# Patient Record
Sex: MALE | Race: WHITE | URBAN - METROPOLITAN AREA
[De-identification: names, ages, dates, MRNs, and addresses within clinical notes are randomized per-mention and may not be internally consistent; named-entity substitution may affect disease eponyms.]

---

## 2017-10-02 ENCOUNTER — OFFICE VISIT (OUTPATIENT)
Dept: UROLOGY | Facility: CLINIC | Age: 82
End: 2017-10-02
Payer: COMMERCIAL

## 2017-10-02 DIAGNOSIS — R31.0 GROSS HEMATURIA: Primary | ICD-10-CM

## 2017-10-02 DIAGNOSIS — N40.1 BENIGN PROSTATIC HYPERPLASIA WITH LOWER URINARY TRACT SYMPTOMS, SYMPTOM DETAILS UNSPECIFIED: ICD-10-CM

## 2017-10-02 PROCEDURE — 99214 OFFICE O/P EST MOD 30 MIN: CPT | Performed by: UROLOGY

## 2017-10-02 RX ORDER — FINASTERIDE 5 MG/1
5 TABLET, FILM COATED ORAL DAILY
Qty: 90 TABLET | Refills: 3 | Status: SHIPPED | OUTPATIENT
Start: 2017-10-02

## 2017-10-02 NOTE — PATIENT INSTRUCTIONS
Please call [facility] to schedule your CT scan at [facility]. [phone].  They are located near the intersection of [facility] and [address] [place] [address].      Your cystoscopy with Dr. Dutta has been scheduled for 11/14/2017 at 10:00.    If you have any questions or need to reschedule please call

## 2017-10-02 NOTE — PROGRESS NOTES
Chief Complaint   Patient presents with     RECHECK     gross hematuria       Pierre Hart is a 86 year old male who presents today for follow up of   Chief Complaint   Patient presents with     RECHECK     gross hematuria    patient f/u for gross hematuria.  He has had gross hematuria for several weeks per nursing home's report.  He has no urinary complaints.  He is s/p TURP several years ago for urinary retention.  He has dementia/parkinson disease.  He has no pain/dysuria.    Current Outpatient Prescriptions   Medication Sig Dispense Refill     finasteride (PROSCAR) 5 MG tablet Take 1 tablet (5 mg) by mouth daily 90 tablet 3     carbidopa-levodopa (SINEMET)  MG per tablet Two tablets at 8am, and 6pm. 2.5 tablets at 1pm. 595 tablet 3     cholecalciferol (VITAMIN  -D) 1000 UNITS capsule Take 1 capsule by mouth daily 2000 mg daily       aspirin 81 MG EC tablet Take 81 mg by mouth daily. 1 daily         ascorbic acid (VITAMIN C) 500 MG tablet Take 500 mg by mouth daily. 1 daily       No Known Allergies   Past Medical History:   Diagnosis Date     Acute nonsuppurative otitis media, unspecified      Allergic rhinitis      BPH      ED (erectile dysfunction)      GERD (gastroesophageal reflux disease)      HCD (health care directive)      High cholesterol      Hypertension      Nonsenile cataract      Other and unspecified chronic nonsuppurative otitis media      S/P transurethral resection of prostate 11/05/2003    transurethral resection of the prostate and 3-way naidu catheter placement     Past Surgical History:   Procedure Laterality Date     CATARACT IOL, RT/LT       ENDOSCOPY  02/07/2002    Upper endoscopy     ENT SURGERY  2009    Right ear Mastoidectomy     GENITOURINARY SURGERY  2010    Bladder     PHACOEMULSIFICATION WITH STANDARD INTRAOCULAR LENS IMPLANT  9/2014; 12/2014    right eye; left eye     Family History   Problem Relation Age of Onset     DIABETES No family hx of      Social History      Social History     Marital status:      Spouse name: Christy     Number of children: 3     Years of education: 14     Occupational History      Retired     1990     Social History Main Topics     Smoking status: Former Smoker     Types: Cigarettes     Quit date: 1/1/1980     Smokeless tobacco: Never Used     Alcohol use 0.0 oz/week     0 Standard drinks or equivalent per week      Comment:   Drinks once torri 2 weeks. 2 beers and a couple shots weekly     Drug use: No     Sexual activity: No     Other Topics Concern     None     Social History Narrative       REVIEW OF SYSTEMS  =================  C: NEGATIVE for fever, chills, change in weight  I: NEGATIVE for worrisome rashes, moles or lesions  E/M: NEGATIVE for ear, mouth and throat problems  R: NEGATIVE for significant cough or SHORTNESS OF BREATH,   CV: NEGATIVE for chest pain, palpitations or peripheral edema  GI: NEGATIVE for nausea, abdominal pain, heartburn, or change in bowel habits  NEURO: NEGATIVE any motor/sensory changes  PSYCH: NEGATIVE for recent mood disorder    Physical Exam:  There were no vitals taken for this visit.   Patient is pleasant, in no acute distress, good general condition.  Lung: no evidence of respiratory distress    Abdomen: Soft, nondistended, non tender. No masses. No rebound or guarding.   Exam: no cva tenderness  Skin: Warm and dry.  No redness.  Psych: normal mood and affect  Neuro: alert and oriented    Assessment/Plan:   (R31.0) Gross hematuria  (primary encounter diagnosis)  Comment:  Most likely prostate related  Plan: CT Abdomen Pelvis Hematuria w/wo IV Contrast,         finasteride (PROSCAR) 5 MG tablet             (N40.1) Benign prostatic hyperplasia with lower urinary tract symptoms, symptom details unspecified  Comment:    Plan: finasteride (PROSCAR) 5 MG tablet

## 2017-10-02 NOTE — MR AVS SNAPSHOT
After Visit Summary   10/2/2017    Pierre Hart    MRN: [id number]           Patient Information     Date Of Birth          10/26/1930        Visit Information        Provider Department      10/2/2017 10:45 AM Anderson Dutta MD [facility]        Today's Diagnoses     Gross hematuria    -  1    Benign prostatic hyperplasia with lower urinary tract symptoms, symptom details unspecified          Care Instructions    Please call [facility] to schedule your CT scan at [facility]. [phone].  They are located near the intersection of [facility] and [address] [place] [address].      Your cystoscopy with Dr. Dutta has been scheduled for 11/14/2017 at 10:00.    If you have any questions or need to reschedule please call             Follow-ups after your visit        Your next 10 appointments already scheduled     Oct 24, 2017 10:00 AM CDT   Return Visit with Ken Hidalgo MD   [facility] ([facility]    [id number] [place] [id number] [phone]            Nov 14, 2017 10:00 AM CST   Return Visit with Anderson Dutta MD, MARZENA CYSTO PROC ROOM   [facility] ([facility])    [address] [place] [id number] [phone]              Future tests that were ordered for you today     Open Future Orders        Priority Expected Expires Ordered    CT Abdomen Pelvis Hematuria w/wo IV Contrast Routine 10/2/2017 10/2/2018 10/2/2017            Who to contact     If you have questions or need follow up information about today's clinic visit or your schedule please contact [facility] directly at [phone].  Normal or non-critical lab and imaging results will be communicated to you by MyChart, letter or phone within 4 business days after the clinic has received the results. If you do not hear from us within 7 days, please contact the clinic through MyChart or phone. If you have a  "critical or abnormal lab result, we will notify you by phone as soon as possible.  Submit refill requests through [employer] or call your pharmacy and they will forward the refill request to us. Please allow 3 business days for your refill to be completed.          Additional Information About Your Visit        [employer] Information     [employer] lets you send messages to your doctor, view your test results, renew your prescriptions, schedule appointments and more. To sign up, go to [email or web address] . Click on \"Log in\" on the left side of the screen, which will take you to the Welcome page. Then click on \"Sign up Now\" on the right side of the page.     You will be asked to enter the access code listed below, as well as some personal information. Please follow the directions to create your username and password.     Your access code is: [email or web address]  Expires: 2017 10:54 AM     Your access code will  in 90 days. If you need help or a new code, please call your [facility] clinic or [phone].        Care EveryWhere ID     This is your Care EveryWhere ID. This could be used by other organizations to access your [facility] medical records  [id number]         Blood Pressure from Last 3 Encounters:   16 (!) 80/52   16 132/72   16 104/56    Weight from Last 3 Encounters:   16 89.8 kg (198 lb)   16 98 kg (216 lb)   16 97.8 kg (215 lb 9.6 oz)                 Today's Medication Changes          These changes are accurate as of: 10/2/17 10:54 AM.  If you have any questions, ask your nurse or doctor.               Start taking these medicines.        Dose/Directions    finasteride 5 MG tablet   Commonly known as:  PROSCAR   Used for:  Gross hematuria, Benign prostatic hyperplasia with lower urinary tract symptoms, symptom details unspecified   Started by:  Anderson Dutta MD        Dose:  5 mg   Take 1 tablet (5 mg) by mouth daily   Quantity:  90 tablet   Refills:  3          "   Where to get your medicines      Some of these will need a paper prescription and others can be bought over the counter.  Ask your nurse if you have questions.     Bring a paper prescription for each of these medications     finasteride 5 MG tablet                Primary Care Provider Office Phone # Fax #    Rodriguez Rizvi [phone] [address] [address] [facility] [address]        Equal Access to Services     [facility] LUDA : Hadii aad ku hadasho Soomaali, waaxda luqadaha, qaybta kaalmada adeegyada, waxay idiin hayaan adeeg kharash la'ankitn ah. So [facility] [phone].    ATENCIÓN: Si habla español, tiene a vega disposición servicios gratuitos de asistencia lingüística. Llame al [phone].    We comply with applicable federal civil rights laws and [place] laws. We do not discriminate on the basis of race, color, national origin, age, disability, sex, sexual orientation, or gender identity.            Thank you!     Thank you for choosing [facility]  for your care. Our goal is always to provide you with excellent care. Hearing back from our patients is one way we can continue to improve our services. Please take a few minutes to complete the written survey that you may receive in the mail after your visit with us. Thank you!             Your Updated Medication List - Protect others around you: Learn how to safely use, store and throw away your medicines at www.disposemymeds.org.          This list is accurate as of: 10/2/17 10:54 AM.  Always use your most recent med list.                   Brand Name Dispense Instructions for use Diagnosis    ascorbic acid 500 MG tablet    VITAMIN C     Take 500 mg by mouth daily. 1 daily        aspirin 81 MG EC tablet      Take 81 mg by mouth daily. 1 daily        carbidopa-levodopa  MG per tablet    SINEMET    595 tablet    Two tablets at 8am, and 6pm. 2.5 tablets at 1pm.    Parkinson disease (H)       cholecalciferol 1000 UNITS capsule     vitamin  -D     Take 1 capsule by mouth daily 2000 mg daily        finasteride 5 MG tablet    PROSCAR    90 tablet    Take 1 tablet (5 mg) by mouth daily    Gross hematuria, Benign prostatic hyperplasia with lower urinary tract symptoms, symptom details unspecified

## 2017-10-24 ENCOUNTER — OFFICE VISIT (OUTPATIENT)
Dept: NEUROLOGY | Facility: CLINIC | Age: 82
End: 2017-10-24
Payer: COMMERCIAL

## 2017-10-24 VITALS — HEIGHT: 74 IN | DIASTOLIC BLOOD PRESSURE: 64 MMHG | SYSTOLIC BLOOD PRESSURE: 99 MMHG | HEART RATE: 86 BPM

## 2017-10-24 DIAGNOSIS — G20.A1 PARKINSON DISEASE (H): Primary | ICD-10-CM

## 2017-10-24 DIAGNOSIS — F02.80 LATE ONSET ALZHEIMER'S DISEASE WITHOUT BEHAVIORAL DISTURBANCE (H): ICD-10-CM

## 2017-10-24 DIAGNOSIS — G30.1 LATE ONSET ALZHEIMER'S DISEASE WITHOUT BEHAVIORAL DISTURBANCE (H): ICD-10-CM

## 2017-10-24 PROCEDURE — 99214 OFFICE O/P EST MOD 30 MIN: CPT | Performed by: PSYCHIATRY & NEUROLOGY

## 2017-10-24 RX ORDER — CHLORHEXIDINE GLUCONATE ORAL RINSE 1.2 MG/ML
15 SOLUTION DENTAL 2 TIMES DAILY
Refills: 0 | COMMUNITY
Start: 2017-10-11

## 2017-10-24 RX ORDER — IBUPROFEN 800 MG/1
TABLET, FILM COATED ORAL DAILY PRN
Refills: 0 | COMMUNITY
Start: 2017-10-11

## 2017-10-24 NOTE — LETTER
10/24/2017         RE: Pierre Hart  [address] [address]        Dear Colleague,    Thank you for referring your patient, Pierre Hart, to the [facility]. Please see a copy of my visit note below.                               ESTABLISHED PATIENT NEUROLOGY NOTE    LOCATION: [facility]  DATE OF VISIT: 10/24/2017    NAME: Mr.Allen Chris Hart  : 10/26/1930 (86 year old)  MR #: [id number]    PRIMARY/REFERRING PROVIDER: Rodriguez Rizvi MD    REASON FOR VISIT: Parkinson disease    HISTORY OF PRESENT ILLNESS: Mr. Hart is accompanied by wife. 87-year-old man with Parkinson disease and Alzheimer disease. Most of the details of history obtained by his wife. He is in a nursing home at [facility] His wife lives in [place]. He has hearing aid for both ears. Wheelchair bound.     He had a fall in 2017. His wife that he thinks that he can walk and he tends to fall. He has been getting physical therapy and getting leg stretching exercises.    Parkinson's disease: He has been taking Sinemet 25/100 strength tablets, 2 at 8 AM and 6 PM, 2.5 tablets at 1 PM. His wife does not think that there are problems with Parkinson disease. No difficulty with the swallowing food. No increased sweating. Tendency to have increased drooling of saliva.    Alzheimer's disease: He was taking Rivastigmine. His primary provider at the nursing home felt that there is no continuing this medication because of the progression of the disease to this degree. It was not found to be helpful.His wife understands it. He has been getting speech therapy along with physical therapy. There are times that he has difficulty finding the right word.  No behavioral problems reported by his wife.  He has been using compression stockings for leg swelling. Tends to keep the feet elevated while sitting in a chair.    FAMILY HISTORY: No family history of Parkinson disease or tremors    No Known  Allergies  Current Outpatient Prescriptions   Medication Sig     ibuprofen (ADVIL/MOTRIN) 800 MG tablet daily as needed      omeprazole (PRILOSEC) 20 MG CR capsule daily      acetaminophen-codeine (TYLENOL #3) 300-30 MG per tablet every 6 hours as needed      finasteride (PROSCAR) 5 MG tablet Take 1 tablet (5 mg) by mouth daily     carbidopa-levodopa (SINEMET)  MG per tablet Two tablets at 8am, and 6pm. 2.5 tablets at 1pm.     cholecalciferol (VITAMIN  -D) 1000 UNITS capsule Take 1 capsule by mouth daily 2000 mg daily     aspirin 81 MG EC tablet Take 81 mg by mouth daily. 1 daily       ascorbic acid (VITAMIN C) 500 MG tablet Take 500 mg by mouth daily. 1 daily     chlorhexidine (PERIDEX) 0.12 % solution Take 15 mLs by mouth 2 times daily GIVE 15cc by mouth two times for wound care (tooth extraction) Rinse for 1 minute then spit     No current facility-administered medications for this visit.        Current Outpatient Prescriptions on File Prior to Visit:  finasteride (PROSCAR) 5 MG tablet Take 1 tablet (5 mg) by mouth daily   carbidopa-levodopa (SINEMET)  MG per tablet Two tablets at 8am, and 6pm. 2.5 tablets at 1pm.   cholecalciferol (VITAMIN  -D) 1000 UNITS capsule Take 1 capsule by mouth daily 2000 mg daily   aspirin 81 MG EC tablet Take 81 mg by mouth daily. 1 daily   ascorbic acid (VITAMIN C) 500 MG tablet Take 500 mg by mouth daily. 1 daily     No current facility-administered medications on file prior to visit.   Past Medical History:   Diagnosis Date     Acute nonsuppurative otitis media, unspecified      Allergic rhinitis      BPH      ED (erectile dysfunction)      GERD (gastroesophageal reflux disease)      HCD (health care directive)      High cholesterol      Hypertension      Nonsenile cataract      Other and unspecified chronic nonsuppurative otitis media      S/P transurethral resection of prostate 11/05/2003    transurethral resection of the prostate and 3-way naidu catheter placement  "    Past Surgical History:   Procedure Laterality Date     CATARACT IOL, RT/LT       ENDOSCOPY  02/07/2002    Upper endoscopy     ENT SURGERY  2009    Right ear Mastoidectomy     GENITOURINARY SURGERY  2010    Bladder     PHACOEMULSIFICATION WITH STANDARD INTRAOCULAR LENS IMPLANT  9/2014; 12/2014    right eye; left eye     PERSONAL & SOCIAL HISTORY Reviewed and documented in [facility]    GENERAL EXAMINATION: BP 99/64 (BP Location: Left arm, Patient Position: Chair, Cuff Size: Adult Regular)  Pulse 86  Ht 1.88 m (6' 2\")    IMPRESSION:   Encounter Diagnoses   Name Primary?     Parkinson disease (H) Yes     Late onset Alzheimer's disease without behavioral disturbance      COMMENTS: Fall has been because of progression of both Parkinson's Disease and AD. Reviewed safety issues.     PLANS:   Patient Instructions   AFTER VISIT SUMMARY (AVS)    Continue Sinemet 25/100 as before    Agree with Pharmacist's recommendation to discontinue RIVASTIGMINE    Continue physical therapy and speech therapy in progress    Fall prevention information      Preventive Neurology: Encouraged to keep physically and mentally active with particular emphasis on daily stretching exercises, and healthy eating.    To call us for follow-up appointment in next 6 month(s) or earlier if needed.    Thanks to Rodriguez Rizvi MD for allowing me to participate in Mr. Hart's care. Please feel free to call me with any questions or concerns.     Total Time: Time with patient 25 minutes, greater than 50% of which was counseling and coordination of care.      Ken Hidalgo MD, [facility]  Neurologist    Cc: Rodriguez Rizvi MD          Again, thank you for allowing me to participate in the care of your patient.        Sincerely,        Ken Hidalgo MD    "

## 2017-10-24 NOTE — NURSING NOTE
"Chief Complaint   Patient presents with     RECHECK     Parkinson Disease/ Falls       Initial BP 99/64 (BP Location: Left arm, Patient Position: Chair, Cuff Size: Adult Regular)  Pulse 86  Ht 1.88 m (6' 2\") Estimated body mass index is 25.42 kg/(m^2) as calculated from the following:    Height as of 9/27/16: 1.88 m (6' 2\").    Weight as of 12/5/16: 89.8 kg (198 lb).  Medication Reconciliation: complete   Bibiana Mcnamara MA      "

## 2017-10-24 NOTE — MR AVS SNAPSHOT
After Visit Summary   10/24/2017    Pierre Hart    MRN: [id number]           Patient Information     Date Of Birth          10/26/1930        Visit Information        Provider Department      10/24/2017 10:00 AM Ken Hidalgo MD [facility]        Today's Diagnoses     Parkinson disease (H)    -  1    Late onset Alzheimer's disease without behavioral disturbance          Care Instructions    AFTER VISIT SUMMARY (AVS)    Continue Sinemet 25/100 as before    Agree with Pharmacist's recommendation to discontinue RIVASTIGMINE    Continue physical therapy and speech therapy in progress    Fall prevention information      Preventive Neurology: Encouraged to keep physically and mentally active with particular emphasis on daily stretching exercises, and healthy eating.    To call us for follow-up appointment in next 6 month(s) or earlier if needed.                      Follow-ups after your visit        Follow-up notes from your care team     Return in about 6 months (around 4/24/2018) for Parkinson's disease follow-up.      Your next 10 appointments already scheduled     Oct 30, 2017 10:00 AM CDT   CT ABDOMEN PELVIS HEMATURIA W/O & W CONTRAST with BECT1   [facility] ([facility])    [address] [facility] [address] [phone]           Please bring any scans or X-rays taken at other hospitals, if similar tests were done. Also bring a list of your medicines, including vitamins, minerals and over-the-counter drugs. It is safest to leave personal items at home.  Be sure to tell your doctor:   If you have any allergies.   If there s any chance you are pregnant.   If you are breastfeeding.   If you have any special needs.  You will have contrast for this exam. To prepare:   Do not eat or drink for 2 hours before your exam. If you need to take medicine, you may take it with small sips of water. (We may ask you to take liquid medicine as well.)    The day before your exam, drink extra fluids at least six 8-ounce glasses (unless your doctor tells you to restrict your fluids).  Patients over 70 or patients with diabetes or kidney problems:   If you haven t had a blood test (creatinine test) within the last 30 days, go to your clinic or Diagnostic Imaging Department for this test.  If you have diabetes:   If your kidney function is normal, continue taking your metformin (Avandamet, Glucophage, Glucovance, Metaglip) on the day of your exam.   If your kidney function is abnormal, wait 48 hours before restarting this medicine.  Please wear loose clothing, such as a sweat suit or jogging clothes. Avoid snaps, zippers and other metal. We may ask you to undress and put on a hospital gown.  If you have any questions, please call the Imaging Department where you will have your exam.            Nov 14, 2017 10:00 AM CST   Return Visit with Anderson Dutta MD, COLLIN CYSTO PROC ROOM   [facility] ([address] [address] [phone]              Who to contact     If you have questions or need follow up information about today's clinic visit or your schedule please contact [facility] directly at [phone].  Normal or non-critical lab and imaging results will be communicated to you by MyChart, letter or phone within 4 business days after the clinic has received the results. If you do not hear from us within 7 days, please contact the clinic through MyChart or phone. If you have a critical or abnormal lab result, we will notify you by phone as soon as possible.  Submit refill requests through [employer] or call your pharmacy and they will forward the refill request to us. Please allow 3 business days for your refill to be completed.          Additional Information About Your Visit        [employer] Information     [employer] lets you send messages to your doctor, view your test results, renew your  "prescriptions, schedule appointments and more. To sign up, go to [email or web address] . Click on \"Log in\" on the left side of the screen, which will take you to the Welcome page. Then click on \"Sign up Now\" on the right side of the page.     You will be asked to enter the access code listed below, as well as some personal information. Please follow the directions to create your username and password.     Your access code is: [email or web address]  Expires: 2017 10:54 AM     Your access code will  in 90 days. If you need help or a new code, please call your [facility] clinic or [phone].        Care EveryWhere ID     This is your Care EveryWhere ID. This could be used by other organizations to access your [facility] medical records  [id number]        Your Vitals Were     Pulse Height                86 1.88 m (6' 2\")           Blood Pressure from Last 3 Encounters:   10/24/17 99/64   16 (!) 80/52   16 132/72    Weight from Last 3 Encounters:   16 89.8 kg (198 lb)   16 98 kg (216 lb)   16 97.8 kg (215 lb 9.6 oz)              Today, you had the following     No orders found for display       Primary Care Provider Office Phone # Fax #    Rodriguez Ilan Rizvi [phone] [address] [facility] [address]        Equal Access to Services     [facility]: Hadii aad ku hadasho Soomaali, waaxda luqadaha, qaybta kaalmada adeegyada, jhonny coleman . So [facility] [phone].    ATENCIÓN: Si habla español, tiene a vega disposición servicios gratuitos de asistencia lingüística. Llame al [phone].    We comply with applicable federal civil rights laws and [place] laws. We do not discriminate on the basis of race, color, national origin, age, disability, sex, sexual orientation, or gender identity.            Thank you!     Thank you for choosing [facility]  for your care. Our goal is always to provide you with excellent care. " Hearing back from our patients is one way we can continue to improve our services. Please take a few minutes to complete the written survey that you may receive in the mail after your visit with us. Thank you!             Your Updated Medication List - Protect others around you: Learn how to safely use, store and throw away your medicines at www.disposemymeds.org.          This list is accurate as of: 10/24/17 11:18 AM.  Always use your most recent med list.                   Brand Name Dispense Instructions for use Diagnosis    acetaminophen-codeine 300-30 MG per tablet    TYLENOL #3     every 6 hours as needed        ascorbic acid 500 MG tablet    VITAMIN C     Take 500 mg by mouth daily. 1 daily        aspirin 81 MG EC tablet      Take 81 mg by mouth daily. 1 daily        carbidopa-levodopa  MG per tablet    SINEMET    595 tablet    Two tablets at 8am, and 6pm. 2.5 tablets at 1pm.    Parkinson disease (H)       chlorhexidine 0.12 % solution    PERIDEX     Take 15 mLs by mouth 2 times daily GIVE 15cc by mouth two times for wound care (tooth extraction) Rinse for 1 minute then spit        cholecalciferol 1000 UNITS capsule    vitamin  -D     Take 1 capsule by mouth daily 2000 mg daily        finasteride 5 MG tablet    PROSCAR    90 tablet    Take 1 tablet (5 mg) by mouth daily    Gross hematuria, Benign prostatic hyperplasia with lower urinary tract symptoms, symptom details unspecified       ibuprofen 800 MG tablet    ADVIL/MOTRIN     daily as needed        omeprazole 20 MG CR capsule    priLOSEC     daily

## 2017-10-24 NOTE — PATIENT INSTRUCTIONS
AFTER VISIT SUMMARY (AVS)    Continue Sinemet 25/100 as before    Continue physical therapy and speech therapy in progress    Fall prevention information      Preventive Neurology: Encouraged to keep physically and mentally active with particular emphasis on daily stretching exercises, and healthy eating.    To call us for follow-up appointment in next 6 month(s) or earlier if needed.

## 2017-10-25 NOTE — PROGRESS NOTES
ESTABLISHED PATIENT NEUROLOGY NOTE    LOCATION: [facility]  DATE OF VISIT: 10/24/2017    NAME: Mr.Allen Chris Hart  : 10/26/1930 (86 year old)  MR #: [id number]    PRIMARY/REFERRING PROVIDER: Rodriguez Rizvi MD    REASON FOR VISIT: Parkinson disease    HISTORY OF PRESENT ILLNESS: Mr. Hart is accompanied by wife. 87-year-old man with Parkinson disease and Alzheimer disease. Most of the details of history obtained by his wife. He is in a nursing home at [facility] His wife lives in [place]. He has hearing aid for both ears. Wheelchair bound.     He had a fall in 2017. His wife that he thinks that he can walk and he tends to fall. He has been getting physical therapy and getting leg stretching exercises.    Parkinson's disease: He has been taking Sinemet 25/100 strength tablets, 2 at 8 AM and 6 PM, 2.5 tablets at 1 PM. His wife does not think that there are problems with Parkinson disease. No difficulty with the swallowing food. No increased sweating. Tendency to have increased drooling of saliva.    Alzheimer's disease: He was taking Rivastigmine. His primary provider at the nursing home felt that there is no continuing this medication because of the progression of the disease to this degree. It was not found to be helpful.His wife understands it. He has been getting speech therapy along with physical therapy. There are times that he has difficulty finding the right word.  No behavioral problems reported by his wife.  He has been using compression stockings for leg swelling. Tends to keep the feet elevated while sitting in a chair.    FAMILY HISTORY: No family history of Parkinson disease or tremors    No Known Allergies  Current Outpatient Prescriptions   Medication Sig     ibuprofen (ADVIL/MOTRIN) 800 MG tablet daily as needed      omeprazole (PRILOSEC) 20 MG CR capsule daily      acetaminophen-codeine (TYLENOL #3) 300-30 MG per tablet every 6 hours as needed       finasteride (PROSCAR) 5 MG tablet Take 1 tablet (5 mg) by mouth daily     carbidopa-levodopa (SINEMET)  MG per tablet Two tablets at 8am, and 6pm. 2.5 tablets at 1pm.     cholecalciferol (VITAMIN  -D) 1000 UNITS capsule Take 1 capsule by mouth daily 2000 mg daily     aspirin 81 MG EC tablet Take 81 mg by mouth daily. 1 daily       ascorbic acid (VITAMIN C) 500 MG tablet Take 500 mg by mouth daily. 1 daily     chlorhexidine (PERIDEX) 0.12 % solution Take 15 mLs by mouth 2 times daily GIVE 15cc by mouth two times for wound care (tooth extraction) Rinse for 1 minute then spit     No current facility-administered medications for this visit.        Current Outpatient Prescriptions on File Prior to Visit:  finasteride (PROSCAR) 5 MG tablet Take 1 tablet (5 mg) by mouth daily   carbidopa-levodopa (SINEMET)  MG per tablet Two tablets at 8am, and 6pm. 2.5 tablets at 1pm.   cholecalciferol (VITAMIN  -D) 1000 UNITS capsule Take 1 capsule by mouth daily 2000 mg daily   aspirin 81 MG EC tablet Take 81 mg by mouth daily. 1 daily   ascorbic acid (VITAMIN C) 500 MG tablet Take 500 mg by mouth daily. 1 daily     No current facility-administered medications on file prior to visit.   Past Medical History:   Diagnosis Date     Acute nonsuppurative otitis media, unspecified      Allergic rhinitis      BPH      ED (erectile dysfunction)      GERD (gastroesophageal reflux disease)      HCD (health care directive)      High cholesterol      Hypertension      Nonsenile cataract      Other and unspecified chronic nonsuppurative otitis media      S/P transurethral resection of prostate 11/05/2003    transurethral resection of the prostate and 3-way naidu catheter placement     Past Surgical History:   Procedure Laterality Date     CATARACT IOL, RT/LT       ENDOSCOPY  02/07/2002    Upper endoscopy     ENT SURGERY  2009    Right ear Mastoidectomy     GENITOURINARY SURGERY  2010    Bladder     PHACOEMULSIFICATION WITH STANDARD  "INTRAOCULAR LENS IMPLANT  9/2014; 12/2014    right eye; left eye     PERSONAL & SOCIAL HISTORY Reviewed and documented in [facility]    GENERAL EXAMINATION: BP 99/64 (BP Location: Left arm, Patient Position: Chair, Cuff Size: Adult Regular)  Pulse 86  Ht 1.88 m (6' 2\")    IMPRESSION:   Encounter Diagnoses   Name Primary?     Parkinson disease (H) Yes     Late onset Alzheimer's disease without behavioral disturbance      COMMENTS: Fall has been because of progression of both Parkinson's Disease and AD. Reviewed safety issues.     PLANS:   Patient Instructions   AFTER VISIT SUMMARY (AVS)    Continue Sinemet 25/100 as before    Agree with Pharmacist's recommendation to discontinue RIVASTIGMINE    Continue physical therapy and speech therapy in progress    Fall prevention information      Preventive Neurology: Encouraged to keep physically and mentally active with particular emphasis on daily stretching exercises, and healthy eating.    To call us for follow-up appointment in next 6 month(s) or earlier if needed.    Thanks to Rodriguez Rizvi MD for allowing me to participate in Mr. Hart's care. Please feel free to call me with any questions or concerns.     Total Time: Time with patient 25 minutes, greater than 50% of which was counseling and coordination of care.      Ken Hidalgo MD, [facility]  Neurologist    Cc: Rodriguez Rizvi MD        "

## 2017-10-30 ENCOUNTER — RADIANT APPOINTMENT (OUTPATIENT)
Dept: CT IMAGING | Facility: CLINIC | Age: 82
End: 2017-10-30
Attending: UROLOGY
Payer: COMMERCIAL

## 2017-10-30 DIAGNOSIS — R31.0 GROSS HEMATURIA: ICD-10-CM

## 2017-10-30 LAB
CREAT BLD-MCNC: 1.5 MG/DL (ref 0.5–1.2)
GFR SERPL CREATININE-BSD FRML MDRD: 44 ML/MIN/{1.73_M2}
GFRB: 41

## 2017-10-30 PROCEDURE — 82565 ASSAY OF CREATININE: CPT

## 2017-10-30 PROCEDURE — 74178 CT ABD&PLV WO CNTR FLWD CNTR: CPT | Mod: TC

## 2017-10-30 PROCEDURE — 36415 COLL VENOUS BLD VENIPUNCTURE: CPT

## 2017-10-30 RX ORDER — IOPAMIDOL 755 MG/ML
95 INJECTION, SOLUTION INTRAVASCULAR ONCE
Status: COMPLETED | OUTPATIENT
Start: 2017-10-30 | End: 2017-10-30

## 2017-10-30 RX ADMIN — IOPAMIDOL 95 ML: 755 INJECTION, SOLUTION INTRAVASCULAR at 10:24

## 2017-11-14 ENCOUNTER — TELEPHONE (OUTPATIENT)
Dept: UROLOGY | Facility: CLINIC | Age: 82
End: 2017-11-14

## 2017-11-14 NOTE — TELEPHONE ENCOUNTER
Reason for call:  Other [facility] Reschedule.   Patient called regarding (reason for call): appointment  Additional comments: [facility] Reschedule patient's transportation got messed up. Please contact to reschedule this. Thank you    Phone number to reach patient:  Other phone number:  May from [facility] [phone] (ask for his nurse if May is not available)     Best Time: ASAP  Can we leave a detailed message on this number?  YES

## 2017-12-22 ENCOUNTER — OFFICE VISIT (OUTPATIENT)
Dept: UROLOGY | Facility: CLINIC | Age: 82
End: 2017-12-22
Payer: COMMERCIAL

## 2017-12-22 DIAGNOSIS — R31.0 GROSS HEMATURIA: Primary | ICD-10-CM

## 2017-12-22 PROCEDURE — 99213 OFFICE O/P EST LOW 20 MIN: CPT | Performed by: UROLOGY

## 2017-12-22 NOTE — PROGRESS NOTES
No chief complaint on file.      Pierre Hart is a 87 year old male who presents today for follow up of No chief complaint on file.   f/u for gross hematuria.  Recent CT scan was normal.  Wife does not want cysto at this time.    Current Outpatient Prescriptions   Medication Sig Dispense Refill     ibuprofen (ADVIL/MOTRIN) 800 MG tablet daily as needed   0     omeprazole (PRILOSEC) 20 MG CR capsule daily   0     acetaminophen-codeine (TYLENOL #3) 300-30 MG per tablet every 6 hours as needed   0     chlorhexidine (PERIDEX) 0.12 % solution Take 15 mLs by mouth 2 times daily GIVE 15cc by mouth two times for wound care (tooth extraction) Rinse for 1 minute then spit  0     finasteride (PROSCAR) 5 MG tablet Take 1 tablet (5 mg) by mouth daily 90 tablet 3     carbidopa-levodopa (SINEMET)  MG per tablet Two tablets at 8am, and 6pm. 2.5 tablets at 1pm. 595 tablet 3     cholecalciferol (VITAMIN  -D) 1000 UNITS capsule Take 1 capsule by mouth daily 2000 mg daily       aspirin 81 MG EC tablet Take 81 mg by mouth daily. 1 daily         ascorbic acid (VITAMIN C) 500 MG tablet Take 500 mg by mouth daily. 1 daily       No Known Allergies   Past Medical History:   Diagnosis Date     Acute nonsuppurative otitis media, unspecified      Allergic rhinitis      BPH      ED (erectile dysfunction)      GERD (gastroesophageal reflux disease)      HCD (health care directive)      High cholesterol      Hypertension      Nonsenile cataract      Other and unspecified chronic nonsuppurative otitis media      S/P transurethral resection of prostate 11/05/2003    transurethral resection of the prostate and 3-way naidu catheter placement     Past Surgical History:   Procedure Laterality Date     CATARACT IOL, RT/LT       ENDOSCOPY  02/07/2002    Upper endoscopy     ENT SURGERY  2009    Right ear Mastoidectomy     GENITOURINARY SURGERY  2010    Bladder     PHACOEMULSIFICATION WITH STANDARD INTRAOCULAR LENS IMPLANT  9/2014; 12/2014     right eye; left eye     Family History   Problem Relation Age of Onset     DIABETES No family hx of      Social History     Social History     Marital status:      Spouse name: Christy     Number of children: 3     Years of education: 14     Occupational History      Retired     1990     Social History Main Topics     Smoking status: Former Smoker     Types: Cigarettes     Quit date: 1/1/1980     Smokeless tobacco: Never Used     Alcohol use 0.0 oz/week     0 Standard drinks or equivalent per week      Comment: 2 cans of beer per month     Drug use: No     Sexual activity: No     Other Topics Concern     Not on file     Social History Narrative       REVIEW OF SYSTEMS  =================  C: NEGATIVE for fever, chills, change in weight  I: NEGATIVE for worrisome rashes, moles or lesions  E/M: NEGATIVE for ear, mouth and throat problems  R: NEGATIVE for significant cough or SHORTNESS OF BREATH,   CV: NEGATIVE for chest pain, palpitations or peripheral edema  GI: NEGATIVE for nausea, abdominal pain, heartburn, or change in bowel habits  NEURO: NEGATIVE any motor/sensory changes  PSYCH: NEGATIVE for recent mood disorder    Physical Exam:  There were no vitals taken for this visit.   Patient is pleasant, in no acute distress, good general condition.  Lung: no evidence of respiratory distress    Abdomen: Soft, nondistended, non tender. No masses. No rebound or guarding.   Exam: no cva tenderness  Skin: Warm and dry.  No redness.  Psych: normal mood and affect  Neuro: alert and oriented    Assessment/Plan:   (R31.0) Gross hematuria  (primary encounter diagnosis)  Comment:  Most likely prostatic bleeding  Plan: cont with proscar           F/u as needed.

## 2017-12-22 NOTE — MR AVS SNAPSHOT
"              After Visit Summary   2017    Pierre Hart    MRN: [id number]           Patient Information     Date Of Birth          10/26/1930        Visit Information        Provider Department      2017 10:15 AM Anderson Dutta MD; [facility] ROOM [facility]        Today's Diagnoses     Gross hematuria    -  1       Follow-ups after your visit        Who to contact     If you have questions or need follow up information about today's clinic visit or your schedule please contact [facility] directly at [phone].  Normal or non-critical lab and imaging results will be communicated to you by Cogenicshart, letter or phone within 4 business days after the clinic has received the results. If you do not hear from us within 7 days, please contact the clinic through Cogen[employer] or phone. If you have a critical or abnormal lab result, we will notify you by phone as soon as possible.  Submit refill requests through Vim[employer] or call your pharmacy and they will forward the refill request to us. Please allow 3 business days for your refill to be completed.          Additional Information About Your Visit        MyChart Information     Vimodi lets you send messages to your doctor, view your test results, renew your prescriptions, schedule appointments and more. To sign up, go to [email or web address] . Click on \"Log in\" on the left side of the screen, which will take you to the Welcome page. Then click on \"Sign up Now\" on the right side of the page.     You will be asked to enter the access code listed below, as well as some personal information. Please follow the directions to create your username and password.     Your access code is: [email or web address]  Expires: 2017  9:54 AM     Your access code will  in 90 days. If you need help or a new code, please call your [facility] or [phone].        Care EveryWhere ID     This is your Care EveryWhere ID. This " could be used by other organizations to access your [facility] medical records  [id number]         Blood Pressure from Last 3 Encounters:   10/24/17 99/64   12/05/16 (!) 80/52   09/27/16 132/72    Weight from Last 3 Encounters:   12/05/16 89.8 kg (198 lb)   05/17/16 98 kg (216 lb)   03/23/16 97.8 kg (215 lb 9.6 oz)              Today, you had the following     No orders found for display       Primary Care Provider Office Phone # Fax #    Rodriguez Ilan Rizvi [phone] [address] [facility] [address]        Equal Access to Services     CALLIE LARES : Hadii cy driscollo Sobelen, waaxda luqadaha, qaybta kaalmada adelewisyada, jhonny coleman . So [facility] [phone].    ATENCIÓN: Si habla español, tiene a vega disposición servicios gratuitos de asistencia lingüística. Llame al [phone].    We comply with applicable federal civil rights laws and [place] laws. We do not discriminate on the basis of race, color, national origin, age, disability, sex, sexual orientation, or gender identity.            Thank you!     Thank you for choosing [facility]  for your care. Our goal is always to provide you with excellent care. Hearing back from our patients is one way we can continue to improve our services. Please take a few minutes to complete the written survey that you may receive in the mail after your visit with us. Thank you!             Your Updated Medication List - Protect others around you: Learn how to safely use, store and throw away your medicines at www.disposemymeds.org.          This list is accurate as of: 12/22/17 10:18 AM.  Always use your most recent med list.                   Brand Name Dispense Instructions for use Diagnosis    acetaminophen-codeine 300-30 MG per tablet    TYLENOL #3     every 6 hours as needed        ascorbic acid 500 MG tablet    VITAMIN C     Take 500 mg by mouth daily. 1 daily        aspirin 81 MG EC tablet       Take 81 mg by mouth daily. 1 daily        carbidopa-levodopa  MG per tablet    SINEMET    595 tablet    Two tablets at 8am, and 6pm. 2.5 tablets at 1pm.    Parkinson disease (H)       chlorhexidine 0.12 % solution    PERIDEX     Take 15 mLs by mouth 2 times daily GIVE 15cc by mouth two times for wound care (tooth extraction) Rinse for 1 minute then spit        cholecalciferol 1000 UNITS capsule    vitamin  -D     Take 1 capsule by mouth daily 2000 mg daily        finasteride 5 MG tablet    PROSCAR    90 tablet    Take 1 tablet (5 mg) by mouth daily    Gross hematuria, Benign prostatic hyperplasia with lower urinary tract symptoms, symptom details unspecified       ibuprofen 800 MG tablet    ADVIL/MOTRIN     daily as needed        omeprazole 20 MG CR capsule    priLOSEC     daily

## 2019-06-04 ENCOUNTER — DOCUMENTATION ONLY (OUTPATIENT)
Dept: OPHTHALMOLOGY | Facility: CLINIC | Age: 84
End: 2019-06-04